# Patient Record
Sex: FEMALE | Race: WHITE | NOT HISPANIC OR LATINO | URBAN - METROPOLITAN AREA
[De-identification: names, ages, dates, MRNs, and addresses within clinical notes are randomized per-mention and may not be internally consistent; named-entity substitution may affect disease eponyms.]

---

## 2017-08-14 ENCOUNTER — EMERGENCY (EMERGENCY)
Facility: HOSPITAL | Age: 52
LOS: 0 days | Discharge: HOME | End: 2017-08-14
Admitting: INTERNAL MEDICINE

## 2017-08-14 DIAGNOSIS — R51 HEADACHE: ICD-10-CM

## 2018-04-10 ENCOUNTER — APPOINTMENT (OUTPATIENT)
Dept: DERMATOLOGY | Facility: CLINIC | Age: 53
End: 2018-04-10

## 2018-04-10 PROBLEM — Z00.00 ENCOUNTER FOR PREVENTIVE HEALTH EXAMINATION: Status: ACTIVE | Noted: 2018-04-10

## 2019-03-07 ENCOUNTER — EMERGENCY (EMERGENCY)
Facility: HOSPITAL | Age: 54
LOS: 0 days | Discharge: HOME | End: 2019-03-07
Attending: EMERGENCY MEDICINE | Admitting: EMERGENCY MEDICINE

## 2019-03-07 VITALS
OXYGEN SATURATION: 100 % | RESPIRATION RATE: 20 BRPM | DIASTOLIC BLOOD PRESSURE: 73 MMHG | HEART RATE: 71 BPM | SYSTOLIC BLOOD PRESSURE: 140 MMHG | TEMPERATURE: 98 F

## 2019-03-07 VITALS
OXYGEN SATURATION: 98 % | DIASTOLIC BLOOD PRESSURE: 93 MMHG | HEART RATE: 70 BPM | SYSTOLIC BLOOD PRESSURE: 135 MMHG | RESPIRATION RATE: 18 BRPM | TEMPERATURE: 98 F

## 2019-03-07 DIAGNOSIS — N93.8 OTHER SPECIFIED ABNORMAL UTERINE AND VAGINAL BLEEDING: ICD-10-CM

## 2019-03-07 DIAGNOSIS — N93.9 ABNORMAL UTERINE AND VAGINAL BLEEDING, UNSPECIFIED: ICD-10-CM

## 2019-03-07 LAB
ANION GAP SERPL CALC-SCNC: 9 MMOL/L — SIGNIFICANT CHANGE UP (ref 7–14)
APPEARANCE UR: CLEAR — SIGNIFICANT CHANGE UP
APTT BLD: 33.8 SEC — SIGNIFICANT CHANGE UP (ref 27–39.2)
BASOPHILS # BLD AUTO: 0.06 K/UL — SIGNIFICANT CHANGE UP (ref 0–0.2)
BASOPHILS NFR BLD AUTO: 0.6 % — SIGNIFICANT CHANGE UP (ref 0–1)
BILIRUB UR-MCNC: NEGATIVE — SIGNIFICANT CHANGE UP
BLD GP AB SCN SERPL QL: SIGNIFICANT CHANGE UP
BUN SERPL-MCNC: 22 MG/DL — HIGH (ref 10–20)
CALCIUM SERPL-MCNC: 9.2 MG/DL — SIGNIFICANT CHANGE UP (ref 8.5–10.1)
CHLORIDE SERPL-SCNC: 108 MMOL/L — SIGNIFICANT CHANGE UP (ref 98–110)
CO2 SERPL-SCNC: 22 MMOL/L — SIGNIFICANT CHANGE UP (ref 17–32)
COLOR SPEC: YELLOW — SIGNIFICANT CHANGE UP
CREAT SERPL-MCNC: 1.1 MG/DL — SIGNIFICANT CHANGE UP (ref 0.7–1.5)
DIFF PNL FLD: ABNORMAL
EOSINOPHIL # BLD AUTO: 0.16 K/UL — SIGNIFICANT CHANGE UP (ref 0–0.7)
EOSINOPHIL NFR BLD AUTO: 1.7 % — SIGNIFICANT CHANGE UP (ref 0–8)
EPI CELLS # UR: ABNORMAL /HPF
GLUCOSE SERPL-MCNC: 100 MG/DL — HIGH (ref 70–99)
GLUCOSE UR QL: NEGATIVE MG/DL — SIGNIFICANT CHANGE UP
HCT VFR BLD CALC: 35.9 % — LOW (ref 37–47)
HGB BLD-MCNC: 11.5 G/DL — LOW (ref 12–16)
IMM GRANULOCYTES NFR BLD AUTO: 0.2 % — SIGNIFICANT CHANGE UP (ref 0.1–0.3)
INR BLD: 1 RATIO — SIGNIFICANT CHANGE UP (ref 0.65–1.3)
KETONES UR-MCNC: NEGATIVE — SIGNIFICANT CHANGE UP
LEUKOCYTE ESTERASE UR-ACNC: NEGATIVE — SIGNIFICANT CHANGE UP
LYMPHOCYTES # BLD AUTO: 3.24 K/UL — SIGNIFICANT CHANGE UP (ref 1.2–3.4)
LYMPHOCYTES # BLD AUTO: 34.5 % — SIGNIFICANT CHANGE UP (ref 20.5–51.1)
MCHC RBC-ENTMCNC: 26.4 PG — LOW (ref 27–31)
MCHC RBC-ENTMCNC: 32 G/DL — SIGNIFICANT CHANGE UP (ref 32–37)
MCV RBC AUTO: 82.5 FL — SIGNIFICANT CHANGE UP (ref 81–99)
MONOCYTES # BLD AUTO: 0.57 K/UL — SIGNIFICANT CHANGE UP (ref 0.1–0.6)
MONOCYTES NFR BLD AUTO: 6.1 % — SIGNIFICANT CHANGE UP (ref 1.7–9.3)
NEUTROPHILS # BLD AUTO: 5.34 K/UL — SIGNIFICANT CHANGE UP (ref 1.4–6.5)
NEUTROPHILS NFR BLD AUTO: 56.9 % — SIGNIFICANT CHANGE UP (ref 42.2–75.2)
NITRITE UR-MCNC: NEGATIVE — SIGNIFICANT CHANGE UP
NRBC # BLD: 0 /100 WBCS — SIGNIFICANT CHANGE UP (ref 0–0)
PH UR: 6 — SIGNIFICANT CHANGE UP (ref 5–8)
PLATELET # BLD AUTO: 247 K/UL — SIGNIFICANT CHANGE UP (ref 130–400)
POTASSIUM SERPL-MCNC: 4.2 MMOL/L — SIGNIFICANT CHANGE UP (ref 3.5–5)
POTASSIUM SERPL-SCNC: 4.2 MMOL/L — SIGNIFICANT CHANGE UP (ref 3.5–5)
PROT UR-MCNC: NEGATIVE MG/DL — SIGNIFICANT CHANGE UP
PROTHROM AB SERPL-ACNC: 11.5 SEC — SIGNIFICANT CHANGE UP (ref 9.95–12.87)
RBC # BLD: 4.35 M/UL — SIGNIFICANT CHANGE UP (ref 4.2–5.4)
RBC # FLD: 14.6 % — HIGH (ref 11.5–14.5)
RBC CASTS # UR COMP ASSIST: SIGNIFICANT CHANGE UP /HPF
SODIUM SERPL-SCNC: 139 MMOL/L — SIGNIFICANT CHANGE UP (ref 135–146)
SP GR SPEC: 1.01 — SIGNIFICANT CHANGE UP (ref 1.01–1.03)
TYPE + AB SCN PNL BLD: SIGNIFICANT CHANGE UP
UROBILINOGEN FLD QL: 0.2 MG/DL — SIGNIFICANT CHANGE UP (ref 0.2–0.2)
WBC # BLD: 9.39 K/UL — SIGNIFICANT CHANGE UP (ref 4.8–10.8)
WBC # FLD AUTO: 9.39 K/UL — SIGNIFICANT CHANGE UP (ref 4.8–10.8)

## 2019-03-07 NOTE — ED PROVIDER NOTE - CLINICAL SUMMARY MEDICAL DECISION MAKING FREE TEXT BOX
Pt presented with 2 days of heavy vaginal bleeding. States that she has had bleeding every 2 weeks. Labs with mild anemia. US with, multiple fibroids and thickened endometrium. Discussed theses results with pt. Plan is d/c to f/up with GYN.

## 2019-03-07 NOTE — ED ADULT TRIAGE NOTE - CHIEF COMPLAINT QUOTE
heavy vaginal bleeding which began yesterday. per patient going thru menopause and has had heavy menses and wants to ensure she is not losing too much blood. denies any abdominal pain.

## 2019-03-07 NOTE — ED ADULT NURSE NOTE - NSIMPLEMENTINTERV_GEN_ALL_ED
Implemented All Universal Safety Interventions:  [place] to call system. Call bell, personal items and telephone within reach. Instruct patient to call for assistance. Room bathroom lighting operational. Non-slip footwear when patient is off stretcher. Physically safe environment: no spills, clutter or unnecessary equipment. Stretcher in lowest position, wheels locked, appropriate side rails in place.

## 2019-03-07 NOTE — ED PROVIDER NOTE - PHYSICAL EXAMINATION
VITAL SIGNS: I have reviewed nursing notes and confirm.  CONSTITUTIONAL: Well-developed; well-nourished; in no acute distress.  SKIN: Skin exam is warm and dry, no acute rash.  HEAD: Normocephalic; atraumatic.  EYES: PERRL, EOM intact; conjunctiva and sclera clear.  ENT: No nasal discharge; airway clear. TMs clear.  NECK: Supple; non tender.  CARD: S1, S2 normal; no murmurs, gallops, or rubs. Regular rate and rhythm.  RESP: No wheezes, rales or rhonchi.  ABD: Normal bowel sounds; soft; non-distended; non-tender;  PELVIC:  brown discharge, scant, no bleeding, os closed, no adnexal ttp or masses  EXT: Normal ROM. No clubbing, cyanosis or edema.

## 2019-03-07 NOTE — ED ADULT NURSE NOTE - OBJECTIVE STATEMENT
Pt c/o heavy vaginal bleeding since yesterday. Pt states she has three similar episodes to this. Pt is currently going through menopause and endorses she has had heavy periods since then. Denies fevers, chills, nausea, vomiting, diarrhea, chest pain, or shortness of breath.

## 2019-03-07 NOTE — ED PROVIDER NOTE - OBJECTIVE STATEMENT
53 yo f with pmh of no pmh, presents with c/o heavy vaginal bleeding.  pt says that she was thinks she is going through menopause.  has been having vaginal bleeding every 2 weeks for the last 2 months.  pt admits feels dizzy intermittently.  gyn dr. valadez on [address].  pt admits had fibroids but removed many years ago.  no abd pain

## 2019-03-07 NOTE — ED PROVIDER NOTE - NS ED ROS FT
Review of Systems:  	•	CONSTITUTIONAL - no fever, no diaphoresis, no weight change  	•	SKIN - no rash  	•	HEMATOLOGIC - no bleeding, no bruising  	•	EYES - no eye pain, no blurred vision  	•	ENT - no change in hearing, no pain  	•	RESPIRATORY - no shortness of breath, no cough  	•	CARDIAC - no chest pain, no palpitations  	•	GI - no abd pain, no nausea, no vomiting, no diarrhea, no constipation, no bleeding  	•	 - no dysuria, no hematuria, no flank pain, no urinary retention, +vaginal bleeding  	•	MUSCULOSKELETAL - no joint paint, no swelling, no redness  	•	NEUROLOGIC - no weakness, no headache, no paresthesias, no dizziness  All other systems negative, unless specified in HPI

## 2019-03-07 NOTE — ED PROVIDER NOTE - NSFOLLOWUPINSTRUCTIONS_ED_ALL_ED_FT
Pt to follow up with GYN. She does not remember the name.  Dysfunctional Uterine Bleeding  ImageDysfunctional uterine bleeding is abnormal bleeding from the uterus. Dysfunctional uterine bleeding includes:    A period that comes earlier or later than usual.  A period that is lighter, heavier, or has blood clots.  Bleeding between periods.  Skipping one or more periods.  Bleeding after sexual intercourse.  Bleeding after menopause.    Follow these instructions at home:  Pay attention to any changes in your symptoms. Follow these instructions to help with your condition:    Eating and drinking     Eat well-balanced meals. Include foods that are high in iron, such as liver, meat, shellfish, green leafy vegetables, and eggs.  If you become constipated:    Drink plenty of water.  Eat fruits and vegetables that are high in water and fiber, such as spinach, carrots, raspberries, apples, and tia.    Medicines     Take over-the-counter and prescription medicines only as told by your health care provider.  Do not change medicines without talking with your health care provider.  Aspirin or medicines that contain aspirin may make the bleeding worse. Do not take those medicines:    During the week before your period.  During your period.    If you were prescribed iron pills, take them as told by your health care provider. Iron pills help to replace iron that your body loses because of this condition.  Activity     If you need to change your sanitary pad or tampon more than one time every 2 hours:    Lie in bed with your feet raised (elevated).  Place a cold pack on your lower abdomen.  Rest as much as possible until the bleeding stops or slows down.    Do not try to lose weight until the bleeding has stopped and your blood iron level is back to normal.  Other Instructions     For two months, write down:    When your period starts.  When your period ends.  When any abnormal bleeding occurs.  What problems you notice.    Keep all follow up visits as told by your health care provider. This is important.  Contact a health care provider if:  You get light-headed or weak.  You have nausea and vomiting.  You cannot eat or drink without vomiting.  You feel dizzy or have diarrhea while you are taking medicines.  You are taking birth control pills or hormones, and you want to change them or stop taking them.  Get help right away if:  You develop a fever or chills.  You need to change your sanitary pad or tampon more than one time per hour.  Your bleeding becomes heavier, or your flow contains clots more often.  You develop pain in your abdomen.  You lose consciousness.  You develop a rash.  This information is not intended to replace advice given to you by your health care provider. Make sure you discuss any questions you have with your health care provider.

## 2020-09-29 ENCOUNTER — EMERGENCY (EMERGENCY)
Facility: HOSPITAL | Age: 55
LOS: 0 days | Discharge: HOME | End: 2020-09-29
Attending: EMERGENCY MEDICINE | Admitting: EMERGENCY MEDICINE
Payer: MEDICAID

## 2020-09-29 VITALS
RESPIRATION RATE: 20 BRPM | SYSTOLIC BLOOD PRESSURE: 126 MMHG | TEMPERATURE: 98 F | OXYGEN SATURATION: 100 % | HEART RATE: 67 BPM | WEIGHT: 190.04 LBS | DIASTOLIC BLOOD PRESSURE: 96 MMHG

## 2020-09-29 DIAGNOSIS — M25.569 PAIN IN UNSPECIFIED KNEE: ICD-10-CM

## 2020-09-29 DIAGNOSIS — M25.561 PAIN IN RIGHT KNEE: ICD-10-CM

## 2020-09-29 PROCEDURE — 73562 X-RAY EXAM OF KNEE 3: CPT | Mod: 26,RT

## 2020-09-29 PROCEDURE — 99283 EMERGENCY DEPT VISIT LOW MDM: CPT

## 2020-09-29 RX ORDER — IBUPROFEN 200 MG
600 TABLET ORAL ONCE
Refills: 0 | Status: COMPLETED | OUTPATIENT
Start: 2020-09-29 | End: 2020-09-29

## 2020-09-29 RX ADMIN — Medication 600 MILLIGRAM(S): at 12:01

## 2020-09-29 RX ADMIN — Medication 600 MILLIGRAM(S): at 11:46

## 2020-09-29 NOTE — ED PROVIDER NOTE - PROGRESS NOTE DETAILS
Resident Fabrizio Statement: I have personally seen and examined this patient. I have fully participated in the care of this patient. I have reviewed all pertinent clinical information, including history, physical exam, plan and the Medical Student's note and agree except as noted.    55 y.o F w/ no pmhx p/w R knee pain x 3 weeks. Pain was insidious and pt saw PMD who ordered US. Pt had no DVT's but was diagnosed with OA. She was given NSAID cream which has not provided much relief. Pt also taking 400mg ibuprofen once a day with some relief. Otherwise, no weakness or numbness, able to ambulated, no fevers, no back pain, no trauma. Peripheral pulses intact and symmetric, minimal point tenderness, full ROM, no palpable cysts, pain not reproducible with movement. ATTENDING NOTE: 54 y/o F here for R knee pain. Last two weeks having pain when ambulating. No trauma, fever or chills. Agree with above exam. Impression: Atraumatic knee pain, XR pain control d/c home with o/p follow up.

## 2020-09-29 NOTE — ED PROVIDER NOTE - NS ED ROS FT
Constitutional:  (-) fever, (-) chills,   ENMT: (-) neck pain  Cardiac: (-) chest pain (-) palpitations  Respiratory:  (-) cough (-) dyspnea  GI:  (-) nausea (-) vomiting (-) diarrhea (-) abdominal pain.  :  (-) dysuria (-) burning.  MS:  (-) back pain (+) joint pain.  Neuro:  (-) numbness (-) tingling (-) focal weakness  Except as documented in the HPI,  all other systems are negative

## 2020-09-29 NOTE — ED PROVIDER NOTE - PATIENT PORTAL LINK FT
You can access the FollowMyHealth Patient Portal offered by [facility] by registering at the following website: [email or web address]. By joining [facility]’s FollowMyHealth portal, you will also be able to view your health information using other applications (apps) compatible with our system.

## 2020-09-29 NOTE — ED PROVIDER NOTE - CARE PROVIDER_API CALL
Denilson Ortiz  Orthopaedic Surgery  [address] [place] [address]  Phone: [phone]  Fax: [phone]  Follow Up Time: Urgent

## 2020-09-29 NOTE — ED PROVIDER NOTE - NSFOLLOWUPINSTRUCTIONS_ED_ALL_ED_FT

## 2020-09-29 NOTE — ED PROVIDER NOTE - PHYSICAL EXAMINATION
CONSTITUTIONAL: well-appearing, in NAD  SKIN: Warm dry, normal skin turgor  HEAD: Normocephalic, grossly atraumatic.   EYES: No conjunctival pallor  ENT: normal pharynx with no erythema or exudates  NECK: Supple  CARD: RRR, no murmurs.  RESP: clear to ausculation b/l. No crackles or wheezing.  ABD: soft, non-tender, non-distended, no rebound or guarding.  KNEE EXAM: There is tenderness to the right knee on valgus stressing. There is no evidence of ecchymosis, warmth, erythema, abrasions or lacerations to bilateral knee.s There is FROM to bilateral knees, DPP are 2+, sensation is equal and intact with 5/5 motor strength bilaterally.   NEURO: Normal motor and sensory as noted above to bilateral LE.   PSYCH: Cooperative, appropriate. Mildly anxious affect. CONSTITUTIONAL: well-appearing, in NAD  SKIN: Warm dry, normal skin turgor  EYES: No conjunctival pallor  ENT: normal pharynx with no erythema or exudates  NECK: Supple  CARD: RRR, no murmurs.  RESP: clear to ausculation b/l. No crackles or wheezing.  ABD: soft, non-tender, non-distended, no rebound or guarding.  KNEE EXAM: There is tenderness to the right knee on valgus stressing. There is no evidence of ecchymosis, warmth, erythema, abrasions or lacerations to bilateral knee.s There is FROM to bilateral knees, DPP are 2+, sensation is equal and intact with 5/5 motor strength bilaterally.   NEURO: Normal motor and sensory as noted above to bilateral LE.   PSYCH: Cooperative, appropriate. Mildly anxious affect.

## 2020-09-29 NOTE — ED PROVIDER NOTE - OBJECTIVE STATEMENT
This is a 55 year old female with a PMhx of osteoarthritis who presents to the ED for evaluation of gradual onset right knee pain which began at rest 2-3 weeks ago. She denies any trauma, forceful movements or other injuries to the affected knee and was able to ambulate afterwards despite her pain. She has been able to perform activities of daily living, including going to the grocery store. Notably, she was seen by her PCP after onset for her varicosities and was told that she had OA on US about 2 weeks ago. Otherwise, she denies any prior surgeries, prior similar pains, subsequent traumas or injuries, h/o clotting disorders, fever, chills, or other symptoms and complaints. Concern regarding persistent symptoms prompted her visit to the ED today. This is a 55 year old female with PMHx OA who presents to the ED for evaluation of gradual onset right knee pain which began at rest 2-3 weeks ago. She denies any trauma, forceful movements or other injuries to the affected knee and was able to ambulate afterwards despite her pain. She has been able to perform activities of daily living, including going to the grocery store. Notably, she was seen by her PCP after onset for her varicosities and was told that she had OA on US about 2 weeks ago. Otherwise, she denies any prior surgeries, prior similar pains, subsequent traumas or injuries, h/o clotting disorders, fever, chills, or other symptoms and complaints. Concern regarding persistent symptoms prompted her visit to the ED today.

## 2021-04-08 ENCOUNTER — EMERGENCY (EMERGENCY)
Facility: HOSPITAL | Age: 56
LOS: 0 days | Discharge: HOME | End: 2021-04-08
Attending: EMERGENCY MEDICINE | Admitting: EMERGENCY MEDICINE
Payer: MEDICAID

## 2021-04-08 VITALS
DIASTOLIC BLOOD PRESSURE: 107 MMHG | TEMPERATURE: 98 F | RESPIRATION RATE: 20 BRPM | OXYGEN SATURATION: 100 % | SYSTOLIC BLOOD PRESSURE: 183 MMHG | HEART RATE: 62 BPM

## 2021-04-08 VITALS
TEMPERATURE: 97 F | OXYGEN SATURATION: 100 % | RESPIRATION RATE: 18 BRPM | DIASTOLIC BLOOD PRESSURE: 81 MMHG | HEART RATE: 59 BPM | SYSTOLIC BLOOD PRESSURE: 145 MMHG

## 2021-04-08 DIAGNOSIS — M25.561 PAIN IN RIGHT KNEE: ICD-10-CM

## 2021-04-08 DIAGNOSIS — M71.21 SYNOVIAL CYST OF POPLITEAL SPACE [BAKER], RIGHT KNEE: ICD-10-CM

## 2021-04-08 DIAGNOSIS — R26.2 DIFFICULTY IN WALKING, NOT ELSEWHERE CLASSIFIED: ICD-10-CM

## 2021-04-08 DIAGNOSIS — M25.562 PAIN IN LEFT KNEE: ICD-10-CM

## 2021-04-08 DIAGNOSIS — M71.22 SYNOVIAL CYST OF POPLITEAL SPACE [BAKER], LEFT KNEE: ICD-10-CM

## 2021-04-08 PROCEDURE — 99283 EMERGENCY DEPT VISIT LOW MDM: CPT

## 2021-04-08 RX ORDER — IBUPROFEN 200 MG
600 TABLET ORAL ONCE
Refills: 0 | Status: COMPLETED | OUTPATIENT
Start: 2021-04-08 | End: 2021-04-08

## 2021-04-08 RX ADMIN — Medication 600 MILLIGRAM(S): at 10:13

## 2021-04-08 NOTE — ED PROVIDER NOTE - OBJECTIVE STATEMENT
56F presenting to ED for pain to b/l posterior knees x 4 months, had outpatient US 1/21 with negative DVT study and b/l popliteal cysts 3.4 cm, patient states pain is consistent with previous pain, not worsening, worse with ambulation and going up stairs, denies fevers/chills, no other complaints.

## 2021-04-08 NOTE — ED PROVIDER NOTE - NSFOLLOWUPINSTRUCTIONS_ED_ALL_ED_FT
Vaca Cyst    WHAT YOU NEED TO KNOW:    What is a Baker cyst? A Baker cyst is a bulging lump of fluid behind your knee. A Baker cyst can develop if you have a knee injury, or a condition such as osteoarthritis or a connective tissue disorder. A Baker cyst may also be called a popliteal cyst.    What are the signs and symptoms of a Baker cyst?   •A lump or swelling in the back of your knee when you stand or walk      •Knee swelling that goes away when you bend your knee      •Knee pain      •Stiffness or tightness in your knee that may get worse with movement      How is a Baker cyst diagnosed?   •Transillumination is a test that can show if the cyst is filled with fluid. Your healthcare provider will shine a light through your cyst.      •X-ray, MRI, or ultrasound pictures may be taken of the bones and tissues in your knee joint. The pictures will show any problems, such as arthritis, a knee injury, or fluid buildup. You may be given contrast liquid to help problems show up better in the pictures. Tell the healthcare provider if you have ever had an allergic reaction to contrast liquid. Do not enter the MRI room with anything metal. Metal can cause serious injury. Tell the healthcare provider if you have any metal in or on your body.      How is a Baker cyst treated? A Vaca cyst will usually go away on its own. If it is large and painful, you may need any of the following:  •NSAIDs, such as ibuprofen, help decrease swelling, pain, and fever. NSAIDs can cause stomach bleeding or kidney problems in certain people. If you take blood thinner medicine, always ask your healthcare provider if NSAIDs are safe for you. Always read the medicine label and follow directions.      •Steroid medicine may be injected into the cyst to decrease fluid, redness, pain, and swelling.      •Aspiration is a procedure used to drain fluid from the cyst through a needle.      •Arthroscopic surgery is done to remove the cyst completely or repair any torn or damaged cartilage. A scope and small surgical instruments are inserted through a small incision in your knee. A scope is a flexible tube with a light, camera, and magnifying glass on the end.      How can I care for my knee?   •Rest as needed. Limit movement as your knee heals. This will help decrease the risk of more damage to your knee. You may need crutches to take weight off your injured knee. Use crutches as directed.      •Ice your knee. Ice helps decrease swelling and pain. Use an ice pack, or put ice in a plastic bag. Cover it with a towel before you place it on your skin. Ice your knee for 15 to 20 minutes, 3 to 4 times each day. Do this for 2 to 3 days.      •Support your knee. Wrap your knee with an elastic bandage. Ask your healthcare provider if you need a brace for more support. This will help decrease swelling and movement so your knee can heal.      •Elevate your knee. Use pillows to raise your knee above the level of your heart as often as you can. This will help decrease swelling. Do not put the pillow directly under your knee. Put it under your calf instead.  Elevate Leg           •Go to physical therapy as directed. A physical therapist teaches you exercises to help improve movement and strength, and to decrease pain.      When should I seek immediate care?   •You have severe pain.      •You have bruising on the ankle below the cyst.      •Your calf turns blue below the cyst.      •Your calf or knee is swollen or bleeding.      When should I call my doctor?   •You have a fever.      •Your pain does not improve with medicine.      •You have questions or concerns about your condition or care.      CARE AGREEMENT:    You have the right to help plan your care. Learn about your health condition and how it may be treated. Discuss treatment options with your healthcare providers to decide what care you want to receive. You always have the right to refuse treatment.

## 2021-04-08 NOTE — ED PROVIDER NOTE - CARE PROVIDER_API CALL
Mil Avila)  [facility]  [address] [place] [address] [place] [address]  Phone: [phone]  Fax: [phone]  Follow Up Time: 4-6 Days

## 2021-04-08 NOTE — ED PROVIDER NOTE - ATTENDING CONTRIBUTION TO CARE
I personally evaluated the patient. I reviewed the Resident’s or Physician Assistant’s note (as assigned above), and agree with the findings and plan except as documented in my note.   57 Y/O F ASTHMA, C/O B/L LEG PAIN X 4 MONTHS. PT HAD A LE DOPPLER 1/2021 WITH B/L BAKER'S CYSTS, REPORT SCANNED TO CHART. PT REPORTS SHE HAS BEEN UNABLE TO GET AN APPT WITH AN ORTHOPEDIST TO FOLLOW UP. PAIN IS PERSISTENT BUT NOT WORSENING. NO LE SWELLING. NO LE PARESTHESIAS OR MOTOR WEAKNESS. NO CP, SOB, PALPITATIONS, DIZZINESS. AMBULATING WELL. VITALS NOTED. ALERT OX3 NAD WELL APPEARING. NCAT PERRL. EOMI. OP NORMAL. MMM. NECK SUPPLE. LUNGS CLEAR B/L. RRR. S1S2. ABD- SOFT NONTENDER. NO LEG EDEMA. I personally evaluated the patient. I reviewed the Resident’s or Physician Assistant’s note (as assigned above), and agree with the findings and plan except as documented in my note.   57 Y/O F ASTHMA, C/O B/L LEG PAIN X 4 MONTHS. PT HAD A LE DOPPLER 1/2021 WITH B/L BAKER'S CYSTS, REPORT SCANNED TO CHART. PT REPORTS SHE HAS BEEN UNABLE TO GET AN APPT WITH AN ORTHOPEDIST TO FOLLOW UP. PAIN IS PERSISTENT BUT NOT WORSENING. NO LE SWELLING. NO LE PARESTHESIAS OR MOTOR WEAKNESS. NO CP, SOB, PALPITATIONS, DIZZINESS. AMBULATING WELL. VITALS NOTED. ALERT OX3 NAD WELL APPEARING. NCAT PERRL. EOMI. OP NORMAL. MMM. NECK SUPPLE. LUNGS CLEAR B/L. RRR. S1S2. ABD- SOFT NONTENDER. + B/L ANKLE EDEMA. + TTP B/L POPLITEAL FOSSA. 2+ PEDAL PULSES B/L. NEURO EXAM NONFOCAL. NORMAL GAIT.

## 2021-04-08 NOTE — ED ADULT TRIAGE NOTE - CHIEF COMPLAINT QUOTE
c/o bilateral lower extremity swelling and pain x 1 week. Patient states had an ultrasound that showed fluid but denies blood clot

## 2021-04-08 NOTE — ED PROVIDER NOTE - PATIENT PORTAL LINK FT
You can access the FollowMyHealth Patient Portal offered by [facility] by registering at the following website: [email or web address]. By joining [facility]’s FollowMyHealth portal, you will also be able to view your health information using other applications (apps) compatible with our system.

## 2021-04-08 NOTE — ED PROVIDER NOTE - CLINICAL SUMMARY MEDICAL DECISION MAKING FREE TEXT BOX
55 Y/O F WITH WITH 4 MONTHS OF B/L LEG PAIN. PT WITH B/L BAKERS CYSTS. PT INSTRUCTED TO FOLLOW UP WITH ORTHO.

## 2021-04-08 NOTE — ED PROVIDER NOTE - PROGRESS NOTE DETAILS
D/W DR. ESCOTO'S OFFICE, THEY ACCEPT HER INSURANCE AND PT GIVEN INFO FOR FOLLOWUP. PT INSTRUCTED TO FOLLOW UP WITH PMD DR. FAUST FOR ANKLE EDEMA AS WELL. PT VERBALIZED UNDERSTANDING.

## 2021-04-08 NOTE — ED PROVIDER NOTE - PHYSICAL EXAMINATION
VITAL SIGNS: I have reviewed nursing notes and confirm.  CONSTITUTIONAL: well-appearing, non-toxic, NAD  SKIN: Warm dry, normal skin turgor  HEAD: NCAT  NECK: Supple; non tender. Full ROM.   CARD: RRR, no murmurs, rubs or gallops  RESP: clear to ausculation b/l.  No rales, rhonchi, or wheezing.  EXT: Full ROM, no bony tenderness, no pedal edema, no calf tenderness  NEURO: normal motor. normal sensory. Normal gait.  PSYCH: Cooperative, appropriate.

## 2021-04-08 NOTE — ED PROVIDER NOTE - NS ED ROS FT
Constitutional: See HPI.  MS: see hpi   Neuro: No weakness.   Skin: No skin rash.  Except as documented in the HPI, all other systems are negative.

## 2021-04-08 NOTE — ED ADULT NURSE NOTE - OBJECTIVE STATEMENT
Patient present to ED with complains of b/l lower extremity pain and swelling for a couple of months as per patient. Denies fevers, chills, chest pain, SOB, dizziness, nausea, vomiting, diarrhea. Denies recent travel and flying.

## 2022-09-08 ENCOUNTER — APPOINTMENT (OUTPATIENT)
Dept: PLASTIC SURGERY | Facility: CLINIC | Age: 57
End: 2022-09-08

## 2022-09-08 VITALS — WEIGHT: 190 LBS | BODY MASS INDEX: 33.25 KG/M2 | HEIGHT: 63.5 IN

## 2022-09-08 PROCEDURE — 99203 OFFICE O/P NEW LOW 30 MIN: CPT

## 2022-09-08 NOTE — ASSESSMENT
[FreeTextEntry1] : Regarding the reconstruction after skin cancer  surgery, we discussed scarring, risk of repeat procedure, poor wound healing, need for additional revisionary surgery,asymmetry, dissatisfaction with the outcome, and unplanned surgery in the future.  All questions were answered.  All risks were well understood by the patient.\par \par Regarding the procedure, we discussed scarring, poor wound healing, bleeding, infection, need for additional surgery, and dissatisfaction with the outcome.  Also discussed possibility of keloid and/or hypertrophic scar formation as well as recurrence.  All questions were answered and risks understood.\par \par Pt referred from derm, not to do Mohs\par \par I suggested excision and closure in OR\par \par she requests any other tx options\par \par she will obtain RT consult as she very strongly prefers not to have surgery\par \par I was clear I recommended surgical tx as best for this small nasal bridge BCC\par \par Due to COVID 19, pre-visit patient instructions were explained to the patient and their symptoms were checked upon arrival.  \par Masks were used by the health care providers and staff and the examination room was cleaned after the patient visit was completed.\par

## 2022-09-08 NOTE — HISTORY OF PRESENT ILLNESS
[FreeTextEntry1] : 57 year old woman w nasal BCC\par \par frist time dx\par \par nonsmoker\par nondiabetic\par \par denies all medical problems

## 2022-09-08 NOTE — PHYSICAL EXAM
[NI] : Normal [de-identified] : nasal bridge well healing bx site, slightly to right on nasal dorsal/sidewall junction

## 2022-10-06 ENCOUNTER — OUTPATIENT (OUTPATIENT)
Dept: OUTPATIENT SERVICES | Facility: HOSPITAL | Age: 57
LOS: 1 days | Discharge: HOME | End: 2022-10-06

## 2022-10-06 ENCOUNTER — APPOINTMENT (OUTPATIENT)
Dept: RADIATION ONCOLOGY | Facility: HOSPITAL | Age: 57
End: 2022-10-06

## 2022-10-06 VITALS
BODY MASS INDEX: 34.24 KG/M2 | DIASTOLIC BLOOD PRESSURE: 79 MMHG | TEMPERATURE: 98.7 F | OXYGEN SATURATION: 99 % | WEIGHT: 196.38 LBS | RESPIRATION RATE: 14 BRPM | SYSTOLIC BLOOD PRESSURE: 117 MMHG | HEART RATE: 63 BPM

## 2022-10-06 DIAGNOSIS — C50.912 MALIGNANT NEOPLASM OF UNSPECIFIED SITE OF LEFT FEMALE BREAST: ICD-10-CM

## 2022-10-06 DIAGNOSIS — Z98.890 OTHER SPECIFIED POSTPROCEDURAL STATES: ICD-10-CM

## 2022-10-06 DIAGNOSIS — Z92.3 PERSONAL HISTORY OF IRRADIATION: ICD-10-CM

## 2022-10-06 DIAGNOSIS — Z78.9 OTHER SPECIFIED HEALTH STATUS: ICD-10-CM

## 2022-10-06 DIAGNOSIS — C44.311 BASAL CELL CARCINOMA OF SKIN OF NOSE: ICD-10-CM

## 2022-10-06 PROCEDURE — 77261 THER RADIOLOGY TX PLNG SMPL: CPT

## 2022-10-06 PROCEDURE — 99204 OFFICE O/P NEW MOD 45 MIN: CPT | Mod: 25

## 2022-10-06 RX ORDER — ACETAMINOPHEN 325 MG/1
TABLET, FILM COATED ORAL
Refills: 0 | Status: ACTIVE | COMMUNITY

## 2022-10-06 NOTE — OB/GYN HISTORY
[Approximately ___ (Month)] : the LMP was approximately [unfilled] month(s) ago [Currently In Menopause] : currently in menopause [___] : Living: [unfilled]

## 2022-10-06 NOTE — REASON FOR VISIT
[Consideration of Curative Therapy] : consideration of curative therapy for [Other: ___] : [unfilled] [Spouse] : spouse

## 2022-10-11 NOTE — PHYSICAL EXAM
[Sclera] : the sclera and conjunctiva were normal [Hearing Threshold Finger Rub Not [place]] : hearing was normal [] : no respiratory distress [Respiration, Rhythm And Depth] : normal respiratory rhythm and effort [Exaggerated Use Of Accessory Muscles For Inspiration] : no accessory muscle use [Heart Rate And Rhythm] : heart rate and rhythm were normal [Nail Clubbing] : no clubbing  or cyanosis of the fingernails [Motor Tone] : muscle strength and tone were normal [Skin Color & Pigmentation] : normal skin color and pigmentation [No Focal Deficits] : no focal deficits [Motor Exam] : the motor exam was normal [Normal] : no palpable adenopathy [de-identified] : s/p skin biopsy, bridge of nose- no erythema, no edema, no pain

## 2022-10-11 NOTE — REVIEW OF SYSTEMS
[Negative] : Psychiatric [Chest Pain] : no chest pain [Shortness Of Breath] : no shortness of breath [Skin Rash] : no skin rash [Skin Wound] : no skin wound

## 2022-10-11 NOTE — DISEASE MANAGEMENT
[Pathological] : TNM Stage: p [FreeTextEntry4] : basal cell carcinoma, nasal dorsum [TTNM] : 1 [NTNM] : 0 [MTNM] : 0 [I] : I [de-identified] : nose, skin

## 2022-10-11 NOTE — LETTER CLOSING
[Consult Closing:] : Thank you for allowing me to participate in the care of this patient.  If you have any questions, please do not hesitate to contact me. [Sincerely yours,] : Sincerely yours, [FreeTextEntry3] : Vanessa Carmichael M.D. \par \par Electronically proofread and signed by:  Vanessa Carmichael MD\par Attending, Department of Radiation Medicine\par [facility]\par \par CC: Dr. Barreto

## 2022-10-11 NOTE — HISTORY OF PRESENT ILLNESS
[FreeTextEntry1] : Patient is a 57 year old woman, who has been following with her dermatologist, Dr. Alejo Flores for skin lesions.  On 7/25/2022 dermatologists performed a biopsy at 3 sites and pathology shows, nasal dorsum, basal cell carcinoma, nodular type and the other sites (left superior upper back and right suprapubic skin),  were seborrheic  keratosis, benign. She was referred to Dr. Barreot for surgical evaluation (Mohs) for her basal cell carcinoma, nasal bridge.   She was not interested in surgical intervention/excision, and wanted to obtain other treatment options.  Therefore, she was referred to discuss radiation options.\par Side Note: PMHx of left breast cancer s/p radiation at [facility] 7-8 years ago, no chemo, no AI.\par \par She is here with her spouse to discuss radiation.  \par \par Dr. Barreto\par Dr. Flores

## 2022-10-12 PROCEDURE — 77290 THER RAD SIMULAJ FIELD CPLX: CPT | Mod: 26

## 2022-10-12 PROCEDURE — 77334 RADIATION TREATMENT AID(S): CPT | Mod: 26

## 2022-10-13 PROCEDURE — 77300 RADIATION THERAPY DOSE PLAN: CPT | Mod: 26

## 2022-10-13 PROCEDURE — 77334 RADIATION TREATMENT AID(S): CPT | Mod: 26

## 2022-10-17 ENCOUNTER — NON-APPOINTMENT (OUTPATIENT)
Age: 57
End: 2022-10-17

## 2022-10-18 VITALS
HEART RATE: 57 BPM | TEMPERATURE: 97.9 F | OXYGEN SATURATION: 99 % | SYSTOLIC BLOOD PRESSURE: 160 MMHG | BODY MASS INDEX: 34.39 KG/M2 | DIASTOLIC BLOOD PRESSURE: 84 MMHG | WEIGHT: 197.25 LBS | RESPIRATION RATE: 12 BRPM

## 2022-10-18 NOTE — REVIEW OF SYSTEMS
[Fatigue: Grade 0] : Fatigue: Grade 0 [Pruritus: Grade 0] : Pruritus: Grade 0 [Dermatitis Radiation: Grade 0] : Dermatitis Radiation: Grade 0

## 2022-10-19 NOTE — PHYSICAL EXAM
[Sclera] : the sclera and conjunctiva were normal [Hearing Threshold Finger Rub Not [place]] : hearing was normal [Respiration, Rhythm And Depth] : normal respiratory rhythm and effort [Exaggerated Use Of Accessory Muscles For Inspiration] : no accessory muscle use [Heart Rate And Rhythm] : heart rate and rhythm were normal [Nail Clubbing] : no clubbing  or cyanosis of the fingernails [No Focal Deficits] : no focal deficits [Motor Exam] : the motor exam was normal [Normal] : oriented to person, place and time, the affect was normal, the mood was normal and not anxious [de-identified] : no erythema

## 2022-10-19 NOTE — DISEASE MANAGEMENT
[Pathological] : TNM Stage: p [I] : I [FreeTextEntry4] : basal cell carcinoma, nasal dorsum [TTNM] : 1 [NTNM] : 0 [MTNM] : 0 [de-identified] : 400 [de-identified] : 2315 [de-identified] : nose, dorsum

## 2022-10-19 NOTE — HISTORY OF PRESENT ILLNESS
[FreeTextEntry1] : New start XRT 1/10 nose, dorsum.  Patient reports feeling well, no new concerns.  Skin care discussed.

## 2022-10-21 PROCEDURE — 77427 RADIATION TX MANAGEMENT X5: CPT

## 2022-10-24 ENCOUNTER — NON-APPOINTMENT (OUTPATIENT)
Age: 57
End: 2022-10-24

## 2022-10-24 VITALS
TEMPERATURE: 97.6 F | OXYGEN SATURATION: 96 % | WEIGHT: 196.8 LBS | HEART RATE: 58 BPM | RESPIRATION RATE: 16 BRPM | SYSTOLIC BLOOD PRESSURE: 136 MMHG | DIASTOLIC BLOOD PRESSURE: 74 MMHG | BODY MASS INDEX: 34.31 KG/M2

## 2022-10-25 ENCOUNTER — NON-APPOINTMENT (OUTPATIENT)
Age: 57
End: 2022-10-25

## 2022-10-27 NOTE — HISTORY OF PRESENT ILLNESS
[FreeTextEntry1] : Patient reports doing well. She denies pain or discomfort at treatment site. She is applying Aquaphor after treatment.

## 2022-10-27 NOTE — PHYSICAL EXAM
[Sclera] : the sclera and conjunctiva were normal [] : no respiratory distress [Exaggerated Use Of Accessory Muscles For Inspiration] : no accessory muscle use [Normal] : oriented to person, place and time, the affect was normal, the mood was normal and not anxious [de-identified] : Minimal erythema on nasal dorsum. No desquamation

## 2022-10-27 NOTE — DISEASE MANAGEMENT
[Pathological] : TNM Stage: p [I] : I [FreeTextEntry4] : basal cell carcinoma, nasal dorsum [TTNM] : 1 [NTNM] : 0 [MTNM] : 0 [de-identified] : 6616 [de-identified] : 5522 [de-identified] : nose, dorsum

## 2022-10-28 PROCEDURE — 77427 RADIATION TX MANAGEMENT X5: CPT

## 2022-12-05 ENCOUNTER — APPOINTMENT (OUTPATIENT)
Dept: RADIATION ONCOLOGY | Facility: HOSPITAL | Age: 57
End: 2022-12-05

## 2022-12-05 VITALS
BODY MASS INDEX: 34.59 KG/M2 | RESPIRATION RATE: 16 BRPM | WEIGHT: 198.38 LBS | DIASTOLIC BLOOD PRESSURE: 64 MMHG | TEMPERATURE: 97.4 F | SYSTOLIC BLOOD PRESSURE: 111 MMHG | OXYGEN SATURATION: 100 % | HEART RATE: 65 BPM

## 2022-12-05 PROCEDURE — 99024 POSTOP FOLLOW-UP VISIT: CPT

## 2022-12-06 NOTE — DISEASE MANAGEMENT
[Pathological] : TNM Stage: p [I] : I [FreeTextEntry4] : basal cell carcinoma, nasal dorsum [TTNM] : 1 [NTNM] : 0 [MTNM] : 0 [de-identified] : 2411 [de-identified] : 9557 [de-identified] : nose, dorsum

## 2022-12-06 NOTE — REVIEW OF SYSTEMS
[Negative] : Psychiatric [Fever] : no fever [Chills] : no chills [Fatigue] : no fatigue [Chest Pain] : no chest pain [Shortness Of Breath] : no shortness of breath

## 2022-12-06 NOTE — HISTORY OF PRESENT ILLNESS
[FreeTextEntry1] : ZACARIAS STEELE returns to clinic in follow up visit.  As you know, the patient is a 57 year old F with a diagnosis of basal cell carcinoma, nasal dorsum.   The patient received 4000 cGy to the nose, dorsum from 10/17/2022  through 10/28/2022 without complications.  I last saw her in October, 2022.    In the interim, the patient has done well.  She continues to moisturize and use sun screen protection to the treated site and her face.  She denies fatigue, pain and has no new concerns.  She is planning a trip to [place] for the holidays and upon her return, will follow up with her dermatologist.   \par \par Upcoming appointments: \par Derm\par [place] \par \par

## 2022-12-06 NOTE — PHYSICAL EXAM
[Sclera] : the sclera and conjunctiva were normal [Hearing Threshold Finger Rub Not [place]] : hearing was normal [] : no respiratory distress [Respiration, Rhythm And Depth] : normal respiratory rhythm and effort [Exaggerated Use Of Accessory Muscles For Inspiration] : no accessory muscle use [Heart Rate And Rhythm] : heart rate and rhythm were normal [Nail Clubbing] : no clubbing  or cyanosis of the fingernails [Skin Color & Pigmentation] : normal skin color and pigmentation [No Focal Deficits] : no focal deficits [Motor Exam] : the motor exam was normal [Normal] : oriented to person, place and time, the affect was normal, the mood was normal and not anxious [de-identified] : no desquamation, faint residual hyperpigmentation

## 2022-12-07 ENCOUNTER — APPOINTMENT (OUTPATIENT)
Dept: RADIATION ONCOLOGY | Facility: HOSPITAL | Age: 57
End: 2022-12-07

## 2023-06-07 ENCOUNTER — APPOINTMENT (OUTPATIENT)
Age: 58
End: 2023-06-07
Payer: MEDICAID

## 2023-06-07 ENCOUNTER — APPOINTMENT (OUTPATIENT)
Dept: RADIATION ONCOLOGY | Facility: HOSPITAL | Age: 58
End: 2023-06-07

## 2023-07-05 ENCOUNTER — APPOINTMENT (OUTPATIENT)
Dept: RADIATION ONCOLOGY | Facility: HOSPITAL | Age: 58
End: 2023-07-05
Payer: MEDICAID

## 2023-07-05 ENCOUNTER — OUTPATIENT (OUTPATIENT)
Dept: OUTPATIENT SERVICES | Facility: HOSPITAL | Age: 58
LOS: 1 days | End: 2023-07-05
Payer: MEDICAID

## 2023-07-05 VITALS
HEART RATE: 87 BPM | RESPIRATION RATE: 16 BRPM | BODY MASS INDEX: 34.76 KG/M2 | DIASTOLIC BLOOD PRESSURE: 47 MMHG | TEMPERATURE: 97.5 F | WEIGHT: 199.38 LBS | OXYGEN SATURATION: 95 % | SYSTOLIC BLOOD PRESSURE: 109 MMHG

## 2023-07-05 DIAGNOSIS — C44.311 BASAL CELL CARCINOMA OF SKIN OF NOSE: ICD-10-CM

## 2023-07-05 DIAGNOSIS — Z92.3 PERSONAL HISTORY OF IRRADIATION: ICD-10-CM

## 2023-07-05 PROCEDURE — 99213 OFFICE O/P EST LOW 20 MIN: CPT

## 2023-07-05 PROCEDURE — 99213 OFFICE O/P EST LOW 20 MIN: CPT | Mod: TC

## 2023-07-05 RX ORDER — TRAZODONE HYDROCHLORIDE 50 MG/1
50 TABLET ORAL
Qty: 30 | Refills: 0 | Status: ACTIVE | COMMUNITY
Start: 2023-05-25

## 2023-07-05 RX ORDER — OXYCODONE HYDROCHLORIDE 30 MG/1
30 TABLET ORAL
Qty: 60 | Refills: 0 | Status: ACTIVE | COMMUNITY
Start: 2023-05-25

## 2023-07-07 DIAGNOSIS — C44.311 BASAL CELL CARCINOMA OF SKIN OF NOSE: ICD-10-CM

## 2023-07-07 NOTE — DISEASE MANAGEMENT
[FreeTextEntry4] : basal cell carcinoma, nasal dorsum [TTNM] : 1 [NTNM] : 0 [MTNM] : 0 [de-identified] : nose, dorsum

## 2023-07-07 NOTE — REVIEW OF SYSTEMS
[Joint Pain] : joint pain [Negative] : Endocrine [Chest Pain] : no chest pain [Palpitations] : no palpitations [Shortness Of Breath] : no shortness of breath [FreeTextEntry9] : chronic back pain  [de-identified] : fat pads

## 2023-07-07 NOTE — HISTORY OF PRESENT ILLNESS
[FreeTextEntry1] : ZACARIAS STEELE returns to clinic in follow up visit.  As you know, the patient is a 58 year old F with a diagnosis of basal cell carcinoma, nasal dorsum.   The patient received 4000 cGy to the nose, dorsum from 10/17/2022  through 10/28/2022 without complications.  I last saw her in Dec, 2022.    In the interim, the patient has done well.  She followed up with her dermatologist 3 weeks ago.   She is applying sun protection.  She is very pleased with her results.  No new concerns.  \par \par Upcoming appointments: \par Derm - Dr. Solorzano

## 2023-07-07 NOTE — PHYSICAL EXAM
[Sclera] : the sclera and conjunctiva were normal [Hearing Threshold Finger Rub Not [place]] : hearing was normal [Heart Rate And Rhythm] : heart rate and rhythm were normal [Heart Sounds] : normal S1 and S2 [Murmurs] : no murmurs present [Edema] : no peripheral edema present [Skin Color & Pigmentation] : normal skin color and pigmentation [No Focal Deficits] : no focal deficits [Motor Exam] : the motor exam was normal [Normal] : oriented to person, place and time, the affect was normal, the mood was normal and not anxious [de-identified] : Dorsum of nose with mild residual hyperpigmentation.  Flat.  No raised bumps.

## 2023-07-07 NOTE — LETTER CLOSING
[Consult Closing:] : Thank you for allowing me to participate in the care of this patient.  If you have any questions, please do not hesitate to contact me. [Sincerely yours,] : Sincerely yours, [FreeTextEntry3] : Vanessa Carmichael M.D. \par \par Electronically proofread and signed by:  Vanessa aCrmichael MD\par Attending, Department of Radiation Medicine\par [facility]\par \par CC: Dr. Barreto PROVIDER:[TOKEN:[[id number]]]

## 2023-10-01 PROBLEM — Z92.3 HISTORY OF RADIATION THERAPY: Status: RESOLVED | Noted: 2022-10-06 | Resolved: 2023-10-01

## 2024-07-10 ENCOUNTER — OUTPATIENT (OUTPATIENT)
Dept: OUTPATIENT SERVICES | Facility: HOSPITAL | Age: 59
LOS: 1 days | Discharge: HOME | End: 2024-07-10
Payer: MEDICAID

## 2024-07-10 ENCOUNTER — APPOINTMENT (OUTPATIENT)
Dept: RADIATION ONCOLOGY | Facility: HOSPITAL | Age: 59
End: 2024-07-10
Payer: MEDICAID

## 2024-07-10 VITALS
TEMPERATURE: 97.8 F | HEART RATE: 76 BPM | WEIGHT: 206.13 LBS | BODY MASS INDEX: 35.94 KG/M2 | SYSTOLIC BLOOD PRESSURE: 113 MMHG | RESPIRATION RATE: 16 BRPM | DIASTOLIC BLOOD PRESSURE: 79 MMHG | OXYGEN SATURATION: 96 %

## 2024-07-10 DIAGNOSIS — C44.311 BASAL CELL CARCINOMA OF SKIN OF NOSE: ICD-10-CM

## 2024-07-10 DIAGNOSIS — Z92.3 PERSONAL HISTORY OF IRRADIATION: ICD-10-CM

## 2024-07-10 PROCEDURE — 99213 OFFICE O/P EST LOW 20 MIN: CPT

## 2024-07-10 PROCEDURE — G2211 COMPLEX E/M VISIT ADD ON: CPT | Mod: NC,1L

## 2024-07-10 RX ORDER — IBUPROFEN 200 MG/1
200 TABLET, COATED ORAL
Refills: 0 | Status: ACTIVE | COMMUNITY

## 2024-07-17 DIAGNOSIS — C44.311 BASAL CELL CARCINOMA OF SKIN OF NOSE: ICD-10-CM

## 2025-02-17 ENCOUNTER — EMERGENCY (EMERGENCY)
Facility: HOSPITAL | Age: 60
LOS: 0 days | Discharge: HOME | End: 2025-02-17
Attending: EMERGENCY MEDICINE
Payer: MEDICAID

## 2025-02-17 VITALS
RESPIRATION RATE: 17 BRPM | TEMPERATURE: 98 F | HEART RATE: 72 BPM | OXYGEN SATURATION: 100 % | DIASTOLIC BLOOD PRESSURE: 96 MMHG | HEIGHT: 63 IN | WEIGHT: 190.04 LBS | SYSTOLIC BLOOD PRESSURE: 132 MMHG

## 2025-02-17 DIAGNOSIS — R51.9 HEADACHE, UNSPECIFIED: ICD-10-CM

## 2025-02-17 PROCEDURE — 96374 THER/PROPH/DIAG INJ IV PUSH: CPT

## 2025-02-17 PROCEDURE — 99284 EMERGENCY DEPT VISIT MOD MDM: CPT | Mod: 25

## 2025-02-17 PROCEDURE — 70450 CT HEAD/BRAIN W/O DYE: CPT | Mod: MC

## 2025-02-17 PROCEDURE — 99284 EMERGENCY DEPT VISIT MOD MDM: CPT

## 2025-02-17 PROCEDURE — 96375 TX/PRO/DX INJ NEW DRUG ADDON: CPT

## 2025-02-17 PROCEDURE — 70450 CT HEAD/BRAIN W/O DYE: CPT | Mod: 26

## 2025-02-17 RX ORDER — SODIUM CHLORIDE 9 G/ML
1000 INJECTION, SOLUTION INTRAVENOUS ONCE
Refills: 0 | Status: COMPLETED | OUTPATIENT
Start: 2025-02-17 | End: 2025-02-17

## 2025-02-17 RX ORDER — METOCLOPRAMIDE 10 MG/1
10 TABLET ORAL ONCE
Refills: 0 | Status: COMPLETED | OUTPATIENT
Start: 2025-02-17 | End: 2025-02-17

## 2025-02-17 RX ORDER — KETOROLAC TROMETHAMINE 10 MG
15 TABLET ORAL ONCE
Refills: 0 | Status: DISCONTINUED | OUTPATIENT
Start: 2025-02-17 | End: 2025-02-17

## 2025-02-17 RX ADMIN — SODIUM CHLORIDE 1000 MILLILITER(S): 9 INJECTION, SOLUTION INTRAVENOUS at 04:54

## 2025-02-17 RX ADMIN — METOCLOPRAMIDE 10 MILLIGRAM(S): 10 TABLET ORAL at 04:54

## 2025-02-17 RX ADMIN — Medication 15 MILLIGRAM(S): at 04:54

## 2025-02-17 NOTE — ED PROVIDER NOTE - PHYSICAL EXAMINATION
VITAL SIGNS: noted  CONSTITUTIONAL: Well-developed; well-nourished; in no acute distress  HEAD: Normocephalic; atraumatic  EYES: PERRL, EOM intact; conjunctiva and sclera clear  ENT: No nasal discharge;, MMM,   NECK: Supple; non tender. No anterior cervical lymphadenopathy noted no meningeal signs  CARD: S1, S2 normal; no murmurs, gallops, or rubs. Regular rate and rhythm  RESP: CTAB/L, no wheezes, rales or rhonchi  ABD: Normal bowel sounds; soft; non-distended; non-tender; no organoomegaly. No CVA tenderness  EXT: Normal ROM. No calf tenderness or edema. Distal pulses intact  NEURO: Awake and alert, oriented. Grossly unremarkable. No focal deficits. CN II-XII intact. no dysmetria.   SKIN: Skin exam is warm and dry, no acute rash

## 2025-02-17 NOTE — ED PROVIDER NOTE - PATIENT PORTAL LINK FT
You can access the FollowMyHealth Patient Portal offered by [facility] by registering at the following website: [email or web address]. By joining [facility]’s FollowMyHealth portal, you will also be able to view your health information using other applications (apps) compatible with our system.

## 2025-02-17 NOTE — ED ADULT TRIAGE NOTE - TEMP AT ED ARRIVAL (C)
June 4, 2019      Deacon Ab Bourgeois  [id number] [address] [place] [id number]        To Whom It May Concern,      I am writing in regard to my patient, Deacon Berger.  He has a very complex medical history and requires regular in home nursing care.  As he is not able to have the same level of nursing care while attending school, I am recommending that his medical and educational needs would best be met with [facility] services for the coming school year.  I will continue to re-evaluated this annually with his family.    Please feel free to contact me with any questions.    Sincerely,        Yumiko Ralph MD           36.5

## 2025-02-17 NOTE — ED ADULT TRIAGE NOTE - CHIEF COMPLAINT QUOTE
pt presented to ED c/o migraines x few months unrelieved by otc advil. pt denies any hx of migraine.

## 2025-02-17 NOTE — ED PROVIDER NOTE - ATTENDING CONTRIBUTION TO CARE
60year-old female presenting with intermittent headache x 2 months.  Has been waking her up at night.  No falls or head trauma.  Denies any F/C, URI symptoms, vision changes, CP/SOB, nausea vomiting, abdominal pain, focal numbness weakness.  Has never seen a neurologist.    PE: as per MLP note

## 2025-02-17 NOTE — ED PROVIDER NOTE - CLINICAL SUMMARY MEDICAL DECISION MAKING FREE TEXT BOX
Labs, EKG and imaging were ordered, where indicated.  Independent interpretation of any labs, EKG & imaging that was ordered was performed by me, Dr. Hollins. Appropriate medications for patient's presenting complaints were ordered and effects were reassessed, where indicated.  Patient's records (prior hospital, ED visit, and/or nursing home note) were reviewed, if available.  Additional history was obtained from EMS, family, and/or PCP (where available).  Escalation to admission/observation was considered.  However patient feels much better and patient/parent is comfortable with discharge.  Appropriate follow-up was arranged.     HA, neuro exam nf, analgesia provided, cth nl - pt reassessed, feeling better, all results d/w pt & copies given, strict return precautions discussed, rec outpt Neuro f/u

## 2025-02-17 NOTE — ED PROVIDER NOTE - PROGRESS NOTE DETAILS
SH  Pt reassessed  States improvement in symptoms  VSS  Results reviewed  Plan to dc f/u with pcp and/or specialist   Pt agrees with plan  Strict ED return precuations given

## 2025-02-17 NOTE — ED PROVIDER NOTE - OBJECTIVE STATEMENT
Patient is a 60-year-old female with no significant past medical history who presents to the ED today with chief complaint of headache.  Per patient she has been experiencing headache on and off for the last 3 to 4 months.  Denies any history of headaches.  Patient states her headache is more frequent last couple of days.  Denies any trauma fever chills neck pain numbness tingling or focal weakness.  Per patient she went to bed tonight and woke up from her sleep due to  headache.  Patient describes the pain as 8 out of 10 right started nonradiating.  Denies any photophobia or phonophobia.  Patient states she has taken Advil 2 days ago without relief of her symptoms.  Denies any smoking alcohol recreational drug use.  Denies any chest pain shortness of breath abdominal pain back pain.

## 2025-02-17 NOTE — ED PROVIDER NOTE - NSFOLLOWUPINSTRUCTIONS_ED_ALL_ED_FT
Our Emergency Department Referral Coordinators will be reaching out to you in the next 24-48 hours from 9:00am to 5:00pm to schedule a follow up appointment. Please expect a phone call from the hospital in that time frame. If you do not receive a call or if you have any questions or concerns, you can reach them at   [phone].  General Headache Without Cause  A headache is pain or discomfort felt around the head or neck area. There are many causes and types of headaches. A few common types include:  Tension headaches.  Migraine headaches.  Cluster headaches.  Chronic daily headaches.  Sometimes, the specific cause of a headache may not be found.    Follow these instructions at home:  Watch your condition for any changes. Let your health care provider know about them. Take these steps to help with your condition:    Managing pain    A bag of ice on a towel on the skin.   A heating pad for use on the painful area.   Take over-the-counter and prescription medicines only as told by your health care provider. Treatment may include medicines for pain that are taken by mouth or applied to the skin.  Lie down in a dark, quiet room when you have a headache.  Keep lights dim if bright lights bother you or make your headaches worse.  If directed, put ice on your head and neck area:  Put ice in a plastic bag.  Place a towel between your skin and the bag.  Leave the ice on for 20 minutes, 2–3 times per day.  Remove the ice if your skin turns bright red. This is very important. If you cannot feel pain, heat, or cold, you have a greater risk of damage to the area.  If directed, apply heat to the affected area. Use the heat source that your health care provider recommends, such as a moist heat pack or a heating pad.  Place a towel between your skin and the heat source.  Leave the heat on for 20–30 minutes.  Remove the heat if your skin turns bright red. This is especially important if you are unable to feel pain, heat, or cold. You have a greater risk of getting burned.  Eating and drinking    Eat meals on a regular schedule.  If you drink alcohol:  Limit how much you have to:  0–1 drink a day for women who are not pregnant.  0–2 drinks a day for men.  Know how much alcohol is in a drink. In the [place] one drink equals one 12 oz bottle of beer (355 mL), one 5 oz glass of wine (148 mL), or one 1½ oz glass of hard liquor (44 mL).  Stop drinking caffeine, or decrease the amount of caffeine you drink.  Drink enough fluid to keep your urine pale yellow.  General instructions    A person writing in a journal.   Keep a headache journal to help find out what may trigger your headaches. For example, write down:  What you eat and drink.  How much sleep you get.  Any change to your diet or medicines.  Try massage or other relaxation techniques.  Limit stress.  Sit up straight, and do not tense your muscles.  Do not use any products that contain nicotine or tobacco. These products include cigarettes, chewing tobacco, and vaping devices, such as e-cigarettes. If you need help quitting, ask your health care provider.  Exercise regularly as told by your health care provider.  Sleep on a regular schedule. Get 7–9 hours of sleep each night, or the amount recommended by your health care provider.  Keep all follow-up visits. This is important.  Contact a health care provider if:  Medicine does not help your symptoms.  You have a headache that is different from your usual headache.  You have nausea or you vomit.  You have a fever.  Get help right away if:  Your headache:  Becomes severe quickly.  Gets worse after moderate to intense physical activity.  You have any of these symptoms:  Repeated vomiting.  Pain or stiffness in your neck.  Changes to your vision.  Pain in an eye or ear.  Problems with speech.  Muscular weakness or loss of muscle control.  Loss of balance or coordination.  You feel faint or pass out.  You have confusion.  You have a seizure.  These symptoms may represent a serious problem that is an emergency. Do not wait to see if the symptoms will go away. Get medical help right away. Call your local emergency services (911 in the [place] Do not drive yourself to the hospital.    Summary  A headache is pain or discomfort felt around the head or neck area.  There are many causes and types of headaches. In some cases, the cause may not be found.  Keep a headache journal to help find out what may trigger your headaches. Watch your condition for any changes. Let your health care provider know about them.  Contact a health care provider if you have a headache that is different from the usual headache, or if your symptoms are not helped by medicine.  Get help right away if your headache becomes severe, you vomit, you have a loss of vision, you lose your balance, or you have a seizure.  This information is not intended to replace advice given to you by your health care provider. Make sure you discuss any questions you have with your health care provider.

## 2025-04-02 NOTE — ED PROVIDER NOTE - NSFOLLOWUPCLINICS_GEN_ALL_ED_FT
Speaking Coherently
  [facility] ([facility])  Rehabilitation  [facility] [place] [address] [place] [address]  Phone: [phone]  Fax:   Follow Up Time: Urgent